# Patient Record
Sex: MALE | Race: OTHER | HISPANIC OR LATINO
[De-identification: names, ages, dates, MRNs, and addresses within clinical notes are randomized per-mention and may not be internally consistent; named-entity substitution may affect disease eponyms.]

---

## 2023-11-10 ENCOUNTER — APPOINTMENT (OUTPATIENT)
Dept: PAIN MANAGEMENT | Facility: CLINIC | Age: 83
End: 2023-11-10
Payer: MEDICARE

## 2023-11-10 ENCOUNTER — NON-APPOINTMENT (OUTPATIENT)
Age: 83
End: 2023-11-10

## 2023-11-10 VITALS
BODY MASS INDEX: 24.75 KG/M2 | WEIGHT: 145 LBS | HEART RATE: 61 BPM | DIASTOLIC BLOOD PRESSURE: 75 MMHG | OXYGEN SATURATION: 96 % | SYSTOLIC BLOOD PRESSURE: 135 MMHG | HEIGHT: 64 IN

## 2023-11-10 DIAGNOSIS — Z78.9 OTHER SPECIFIED HEALTH STATUS: ICD-10-CM

## 2023-11-10 DIAGNOSIS — C61 MALIGNANT NEOPLASM OF PROSTATE: ICD-10-CM

## 2023-11-10 PROBLEM — Z00.00 ENCOUNTER FOR PREVENTIVE HEALTH EXAMINATION: Status: ACTIVE | Noted: 2023-11-10

## 2023-11-10 PROCEDURE — 99204 OFFICE O/P NEW MOD 45 MIN: CPT

## 2023-11-10 RX ORDER — TAMSULOSIN HYDROCHLORIDE 0.4 MG/1
0.4 CAPSULE ORAL
Refills: 0 | Status: ACTIVE | COMMUNITY

## 2023-11-10 RX ORDER — GABAPENTIN 300 MG/1
300 CAPSULE ORAL
Refills: 0 | Status: ACTIVE | COMMUNITY

## 2023-11-21 ENCOUNTER — TRANSCRIPTION ENCOUNTER (OUTPATIENT)
Age: 83
End: 2023-11-21

## 2023-11-21 ENCOUNTER — APPOINTMENT (OUTPATIENT)
Dept: PAIN MANAGEMENT | Facility: HOSPITAL | Age: 83
End: 2023-11-21

## 2023-12-01 ENCOUNTER — APPOINTMENT (OUTPATIENT)
Dept: PAIN MANAGEMENT | Facility: CLINIC | Age: 83
End: 2023-12-01
Payer: MEDICARE

## 2023-12-01 VITALS
OXYGEN SATURATION: 95 % | HEIGHT: 64 IN | HEART RATE: 66 BPM | WEIGHT: 145 LBS | DIASTOLIC BLOOD PRESSURE: 78 MMHG | BODY MASS INDEX: 24.75 KG/M2 | SYSTOLIC BLOOD PRESSURE: 131 MMHG

## 2023-12-01 PROCEDURE — 99214 OFFICE O/P EST MOD 30 MIN: CPT

## 2023-12-04 ENCOUNTER — APPOINTMENT (OUTPATIENT)
Dept: PAIN MANAGEMENT | Facility: CLINIC | Age: 83
End: 2023-12-04
Payer: MEDICARE

## 2023-12-04 VITALS
HEIGHT: 64 IN | SYSTOLIC BLOOD PRESSURE: 127 MMHG | BODY MASS INDEX: 24.75 KG/M2 | HEART RATE: 69 BPM | DIASTOLIC BLOOD PRESSURE: 75 MMHG | WEIGHT: 145 LBS | RESPIRATION RATE: 15 BRPM | OXYGEN SATURATION: 94 %

## 2023-12-04 PROCEDURE — 62323 NJX INTERLAMINAR LMBR/SAC: CPT

## 2023-12-15 ENCOUNTER — APPOINTMENT (OUTPATIENT)
Dept: PAIN MANAGEMENT | Facility: CLINIC | Age: 83
End: 2023-12-15
Payer: MEDICARE

## 2023-12-15 VITALS
HEIGHT: 64 IN | WEIGHT: 145 LBS | OXYGEN SATURATION: 96 % | HEART RATE: 76 BPM | BODY MASS INDEX: 24.75 KG/M2 | DIASTOLIC BLOOD PRESSURE: 78 MMHG | SYSTOLIC BLOOD PRESSURE: 129 MMHG

## 2023-12-15 PROCEDURE — 99214 OFFICE O/P EST MOD 30 MIN: CPT

## 2023-12-15 NOTE — DATA REVIEWED
[FreeTextEntry1] : MRI Lumbar Spine:  L3-L4: severe central canal and moderate bilateral foraminal stenosis L4-L5: moderate central canal with moderate right and mild left foraminal stenosis L5-S1: mild central canal and moderate bilateral foraminal stenosis.

## 2023-12-15 NOTE — ASSESSMENT
[FreeTextEntry1] : 83 yom w/ severe low back and right leg pain which has improved substantially after RONEL.   Physical therapy prescribed - goal will be to increase ROM, strengthening, postural training, other modalities ad angelique which may include massage and stim. Goals of therapy discussed with the patient in detail and will be discussed with physical therapist. Patient will follow-up following course of physical therapy to monitor progress and adjust therapy as needed.  Acetaminophen 1,000 mg q8h prn for moderate pain. Risks, benefits, and alternatives of acetaminophen discussed with patient.  Ibuprofen 600 mg q8h prn add when pain is not adequately controlled with acetaminophen. Risks, benefits, and alternatives of ibuprofen discussed with patient.  Diet and nutritional strategies discussed which may improve patient's pain and will improve overall health.  RTC one month.

## 2023-12-15 NOTE — HISTORY OF PRESENT ILLNESS
[Back Pain] : back pain [Constant] : constant [8] : a current pain level of 8/10 [7] : an average pain level of 7/10 [6] : a minimum pain level of 6/10 [10] : a maximum pain level of 10/10 [Sharp] : sharp [Dull] : dull [Aching] : aching [Rest] : rest [FreeTextEntry1] : Interval History: Patient returns for follow-up today. He is s/p RONEL with 90% improvement in pain. He wishes to know what he can continue to do to improve his pain.   HPI: 82 yom presents w/ severe back pain. Pain radiates down the right leg. The left leg is wnl. He had three years of relief with previous RONEL. Quality of life is impaired. There has been a severe exacerbation of the patient's chronic pain.  Interventions: L5-S1 Interlaminar RONEL (12/04/23): Right L3-L4 and L4-L5 TFESI

## 2023-12-15 NOTE — PHYSICAL EXAM
[de-identified] : Constitutional: Well-developed, in no acute distress  Musculoskeletal: Lumbar Spine:   Gait: Antalgic 		Inspection: Normal curvature, no abnormal kyphosis or scoliosis 		Facet loading: pain bilaterally 		Palpation: 			Lumbar and paraspinal muscles: pain bilaterally 			Sacroiliac joint: no pain 			Greater trochanter: no pain 		Muscle Strength: 		Iliopsoas: 5/5 bilaterally 		Quadriceps: 5/5 bilaterally 		Hamstrings: 5/5 bilaterally 		Tibialis anterior: 5/5 bilaterally 		Extensor hallucis longus: 5/5 bilaterally  		Sensation: normal and equal in bilateral lower extremities  Extremity: no edema noted Neurological: Memory normal, AAO x 3, Cranial nerves II - XII grossly normal Psychiatric: Appropriate mood and affect, oriented to time, place, person, and situation

## 2024-01-26 ENCOUNTER — APPOINTMENT (OUTPATIENT)
Dept: PAIN MANAGEMENT | Facility: CLINIC | Age: 84
End: 2024-01-26
Payer: MEDICARE

## 2024-01-26 VITALS — HEIGHT: 64 IN | WEIGHT: 145 LBS | BODY MASS INDEX: 24.75 KG/M2

## 2024-01-26 DIAGNOSIS — G58.8 OTHER SPECIFIED MONONEUROPATHIES: ICD-10-CM

## 2024-01-26 DIAGNOSIS — M54.16 RADICULOPATHY, LUMBAR REGION: ICD-10-CM

## 2024-01-26 DIAGNOSIS — M48.07 SPINAL STENOSIS, LUMBOSACRAL REGION: ICD-10-CM

## 2024-01-26 DIAGNOSIS — M47.817 SPONDYLOSIS W/OUT MYELOPATHY OR RADICULOPATHY, LUMBOSACRAL REGION: ICD-10-CM

## 2024-01-26 PROCEDURE — 99214 OFFICE O/P EST MOD 30 MIN: CPT

## 2024-01-26 PROCEDURE — G2211 COMPLEX E/M VISIT ADD ON: CPT

## 2024-01-26 NOTE — PHYSICAL EXAM
[de-identified] : Constitutional: Well-developed, in no acute distress  Musculoskeletal: Lumbar Spine:   Gait: Antalgic 		Inspection: Normal curvature, no abnormal kyphosis or scoliosis 		Facet loading: pain bilaterally 		Palpation: 			Lumbar and paraspinal muscles: pain bilaterally 			Sacroiliac joint: no pain 			Greater trochanter: no pain 		Muscle Strength: 		Iliopsoas: 5/5 bilaterally 		Quadriceps: 5/5 bilaterally 		Hamstrings: 5/5 bilaterally 		Tibialis anterior: 5/5 bilaterally 		Extensor hallucis longus: 5/5 bilaterally  		Sensation: normal and equal in bilateral lower extremities  Extremity: no edema noted Neurological: Memory normal, AAO x 3, Cranial nerves II - XII grossly normal Psychiatric: Appropriate mood and affect, oriented to time, place, person, and situation

## 2024-01-26 NOTE — ASSESSMENT
[FreeTextEntry1] : 83 yom w/ severe low back and right leg pain which has improved substantially after RONEL but still has pain in the buttock.  I have personally reviewed the patient's MRI in detail and discussed it with them which is significant for severe spinal stenosis.  The patient has failed to have relief with medication management. The patient has failed to have relief with more than six weeks of physical therapy within the last three months. Given the patients failure to improve with all other conservative measures, recommend right cluneal nerve block under ultrasound guidance. The patient will follow-up with me in my office two weeks following intervention.  Physical therapy prescribed - goal will be to increase ROM, strengthening, postural training, other modalities ad angelique which may include massage and stim. Goals of therapy discussed with the patient in detail and will be discussed with physical therapist. Patient will follow-up following course of physical therapy to monitor progress and adjust therapy as needed.  Acetaminophen 1,000 mg q8h prn for moderate pain. Risks, benefits, and alternatives of acetaminophen discussed with patient.  Ibuprofen 600 mg q8h prn add when pain is not adequately controlled with acetaminophen. Risks, benefits, and alternatives of ibuprofen discussed with patient.  Based on the current evaluation and management, I will provide ongoing, longitudinal care for the complex painful condition described above. Patient is encouraged to reach out with any questions and/or concerns regarding their condition and care.  Diet and nutritional strategies discussed which may improve patient's pain and will improve overall health.  RTC one month.

## 2024-01-26 NOTE — HISTORY OF PRESENT ILLNESS
[Back Pain] : back pain [Constant] : constant [8] : a current pain level of 8/10 [7] : an average pain level of 7/10 [6] : a minimum pain level of 6/10 [10] : a maximum pain level of 10/10 [Sharp] : sharp [Dull] : dull [Aching] : aching [Rest] : rest [FreeTextEntry1] : Interval History: Patient returns for follow-up today. He has one pinpoint area of pain in his right lower buttock region. No longer any pain in the back or down the leg. Quality of life is impaired. There has been a severe exacerbation of the patient's chronic pain. Pain is only present when sitting.   HPI: 82 yom presents w/ severe back pain. Pain radiates down the right leg. The left leg is wnl. He had three years of relief with previous RONEL. Quality of life is impaired. There has been a severe exacerbation of the patient's chronic pain.  Interventions: L5-S1 Interlaminar RONEL (12/04/23): Right L3-L4 and L4-L5 TFESI

## 2024-01-30 ENCOUNTER — TRANSCRIPTION ENCOUNTER (OUTPATIENT)
Age: 84
End: 2024-01-30

## 2024-01-30 ENCOUNTER — APPOINTMENT (OUTPATIENT)
Dept: PAIN MANAGEMENT | Facility: HOSPITAL | Age: 84
End: 2024-01-30

## 2025-05-15 ENCOUNTER — TRANSCRIPTION ENCOUNTER (OUTPATIENT)
Age: 85
End: 2025-05-15